# Patient Record
Sex: MALE | Race: WHITE
[De-identification: names, ages, dates, MRNs, and addresses within clinical notes are randomized per-mention and may not be internally consistent; named-entity substitution may affect disease eponyms.]

---

## 2018-05-21 ENCOUNTER — HOSPITAL ENCOUNTER (INPATIENT)
Dept: HOSPITAL 17 - NEPD | Age: 23
LOS: 4 days | Discharge: HOME | DRG: 505 | End: 2018-05-25
Payer: COMMERCIAL

## 2018-05-21 VITALS
RESPIRATION RATE: 19 BRPM | SYSTOLIC BLOOD PRESSURE: 141 MMHG | HEART RATE: 72 BPM | DIASTOLIC BLOOD PRESSURE: 83 MMHG | OXYGEN SATURATION: 100 % | TEMPERATURE: 97.7 F

## 2018-05-21 VITALS
HEART RATE: 97 BPM | RESPIRATION RATE: 15 BRPM | SYSTOLIC BLOOD PRESSURE: 118 MMHG | TEMPERATURE: 98.1 F | OXYGEN SATURATION: 97 % | DIASTOLIC BLOOD PRESSURE: 74 MMHG

## 2018-05-21 VITALS
HEART RATE: 75 BPM | SYSTOLIC BLOOD PRESSURE: 140 MMHG | OXYGEN SATURATION: 97 % | DIASTOLIC BLOOD PRESSURE: 65 MMHG | RESPIRATION RATE: 16 BRPM

## 2018-05-21 VITALS — WEIGHT: 277.56 LBS | HEIGHT: 71 IN | BODY MASS INDEX: 38.86 KG/M2

## 2018-05-21 VITALS — OXYGEN SATURATION: 100 %

## 2018-05-21 DIAGNOSIS — S93.324A: Primary | ICD-10-CM
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
## 2018-05-21 LAB
BASOPHILS # BLD AUTO: 0 TH/MM3 (ref 0–0.2)
BASOPHILS NFR BLD: 0.3 % (ref 0–2)
BUN SERPL-MCNC: 15 MG/DL (ref 7–18)
CALCIUM SERPL-MCNC: 8.6 MG/DL (ref 8.5–10.1)
CHLORIDE SERPL-SCNC: 105 MEQ/L (ref 98–107)
COLOR UR: YELLOW
CREAT SERPL-MCNC: 1.11 MG/DL (ref 0.6–1.3)
EOSINOPHIL # BLD: 0.3 TH/MM3 (ref 0–0.4)
EOSINOPHIL NFR BLD: 1.8 % (ref 0–4)
ERYTHROCYTE [DISTWIDTH] IN BLOOD BY AUTOMATED COUNT: 13.4 % (ref 11.6–17.2)
GFR SERPLBLD BASED ON 1.73 SQ M-ARVRAT: 82 ML/MIN (ref 89–?)
GLUCOSE SERPL-MCNC: 91 MG/DL (ref 74–106)
GLUCOSE UR STRIP-MCNC: (no result) MG/DL
HCO3 BLD-SCNC: 26.5 MEQ/L (ref 21–32)
HCT VFR BLD CALC: 42.6 % (ref 39–51)
HGB BLD-MCNC: 14.5 GM/DL (ref 13–17)
HGB UR QL STRIP: (no result)
HYALINE CASTS #/AREA URNS LPF: 2 /LPF
INR PPP: 1 RATIO
KETONES UR STRIP-MCNC: (no result) MG/DL
LYMPHOCYTES # BLD AUTO: 1.8 TH/MM3 (ref 1–4.8)
LYMPHOCYTES NFR BLD AUTO: 10.8 % (ref 9–44)
MCH RBC QN AUTO: 29.7 PG (ref 27–34)
MCHC RBC AUTO-ENTMCNC: 33.9 % (ref 32–36)
MCV RBC AUTO: 87.6 FL (ref 80–100)
MONOCYTE #: 0.9 TH/MM3 (ref 0–0.9)
MONOCYTES NFR BLD: 5.1 % (ref 0–8)
MUCOUS THREADS #/AREA URNS LPF: (no result) /LPF
NEUTROPHILS # BLD AUTO: 13.6 TH/MM3 (ref 1.8–7.7)
NEUTROPHILS NFR BLD AUTO: 82 % (ref 16–70)
NITRITE UR QL STRIP: (no result)
PLATELET # BLD: 309 TH/MM3 (ref 150–450)
PMV BLD AUTO: 7.2 FL (ref 7–11)
PROTHROMBIN TIME: 10.6 SEC (ref 9.8–11.6)
RBC # BLD AUTO: 4.87 MIL/MM3 (ref 4.5–5.9)
SODIUM SERPL-SCNC: 139 MEQ/L (ref 136–145)
SP GR UR STRIP: 1.01 (ref 1–1.03)
URINE LEUKOCYTE ESTERASE: (no result)
WBC # BLD AUTO: 16.6 TH/MM3 (ref 4–11)

## 2018-05-21 PROCEDURE — 96374 THER/PROPH/DIAG INJ IV PUSH: CPT

## 2018-05-21 PROCEDURE — 73564 X-RAY EXAM KNEE 4 OR MORE: CPT

## 2018-05-21 PROCEDURE — 28600 TREAT FOOT DISLOCATION: CPT

## 2018-05-21 PROCEDURE — 80048 BASIC METABOLIC PNL TOTAL CA: CPT

## 2018-05-21 PROCEDURE — 85025 COMPLETE CBC W/AUTO DIFF WBC: CPT

## 2018-05-21 PROCEDURE — 84100 ASSAY OF PHOSPHORUS: CPT

## 2018-05-21 PROCEDURE — 70450 CT HEAD/BRAIN W/O DYE: CPT

## 2018-05-21 PROCEDURE — 72125 CT NECK SPINE W/O DYE: CPT

## 2018-05-21 PROCEDURE — 94150 VITAL CAPACITY TEST: CPT

## 2018-05-21 PROCEDURE — C1713 ANCHOR/SCREW BN/BN,TIS/BN: HCPCS

## 2018-05-21 PROCEDURE — 99152 MOD SED SAME PHYS/QHP 5/>YRS: CPT

## 2018-05-21 PROCEDURE — 73620 X-RAY EXAM OF FOOT: CPT

## 2018-05-21 PROCEDURE — 76000 FLUOROSCOPY <1 HR PHYS/QHP: CPT

## 2018-05-21 PROCEDURE — 99153 MOD SED SAME PHYS/QHP EA: CPT

## 2018-05-21 PROCEDURE — 73610 X-RAY EXAM OF ANKLE: CPT

## 2018-05-21 PROCEDURE — 83735 ASSAY OF MAGNESIUM: CPT

## 2018-05-21 PROCEDURE — 85610 PROTHROMBIN TIME: CPT

## 2018-05-21 PROCEDURE — 81001 URINALYSIS AUTO W/SCOPE: CPT

## 2018-05-21 PROCEDURE — 36415 COLL VENOUS BLD VENIPUNCTURE: CPT

## 2018-05-21 PROCEDURE — 86901 BLOOD TYPING SEROLOGIC RH(D): CPT

## 2018-05-21 PROCEDURE — 74177 CT ABD & PELVIS W/CONTRAST: CPT

## 2018-05-21 PROCEDURE — 0SSKXZZ REPOSITION RIGHT TARSOMETATARSAL JOINT, EXTERNAL APPROACH: ICD-10-PCS | Performed by: PODIATRIST

## 2018-05-21 PROCEDURE — 86900 BLOOD TYPING SEROLOGIC ABO: CPT

## 2018-05-21 PROCEDURE — 71260 CT THORAX DX C+: CPT

## 2018-05-21 PROCEDURE — 73630 X-RAY EXAM OF FOOT: CPT

## 2018-05-21 PROCEDURE — 80053 COMPREHEN METABOLIC PANEL: CPT

## 2018-05-21 PROCEDURE — 86850 RBC ANTIBODY SCREEN: CPT

## 2018-05-21 RX ADMIN — Medication SCH ML: at 21:00
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
## 2018-05-21 NOTE — RADRPT
EXAM DATE/TIME:  05/21/2018 18:18 

 

[facility] COMPARISON:     

FOOT RIGHT COMPLETE ([id number]), May 21, 2018, 16:00.

 

                     

INDICATIONS :     

Post reduction right foot

                     

 

MEDICAL HISTORY :     

None.          

 

SURGICAL HISTORY :     

None.   

 

ENCOUNTER:     

Subsequent                                        

 

ACUITY:     

1 day      

 

PAIN SCORE:     

Non-responsive.

 

LOCATION:     

Right  Foot

 

FINDINGS:     

2 views of the right foot were performed with split material in place. There has been reduction of th
e previously seen Lisfranc fracture/dislocation.

 

CONCLUSION:     

Reduction of the previously seen the Lisfranc fracture/dislocation.

 

 

 

 Cuauhtemoc Vale Jr., MD on May 21, 2018 at 18:41           

Board Certified Radiologist.

 This report was verified electronically.
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
## 2018-05-22 VITALS
RESPIRATION RATE: 17 BRPM | DIASTOLIC BLOOD PRESSURE: 58 MMHG | HEART RATE: 65 BPM | OXYGEN SATURATION: 95 % | TEMPERATURE: 98.4 F | SYSTOLIC BLOOD PRESSURE: 124 MMHG

## 2018-05-22 VITALS
OXYGEN SATURATION: 98 % | DIASTOLIC BLOOD PRESSURE: 68 MMHG | TEMPERATURE: 97.7 F | RESPIRATION RATE: 18 BRPM | HEART RATE: 59 BPM | SYSTOLIC BLOOD PRESSURE: 119 MMHG

## 2018-05-22 VITALS
DIASTOLIC BLOOD PRESSURE: 63 MMHG | RESPIRATION RATE: 18 BRPM | TEMPERATURE: 97.9 F | OXYGEN SATURATION: 96 % | SYSTOLIC BLOOD PRESSURE: 115 MMHG | HEART RATE: 64 BPM

## 2018-05-22 VITALS
RESPIRATION RATE: 17 BRPM | OXYGEN SATURATION: 98 % | DIASTOLIC BLOOD PRESSURE: 57 MMHG | SYSTOLIC BLOOD PRESSURE: 110 MMHG | HEART RATE: 64 BPM | TEMPERATURE: 98.2 F

## 2018-05-22 VITALS
SYSTOLIC BLOOD PRESSURE: 123 MMHG | OXYGEN SATURATION: 97 % | TEMPERATURE: 98.6 F | HEART RATE: 56 BPM | RESPIRATION RATE: 17 BRPM | DIASTOLIC BLOOD PRESSURE: 70 MMHG

## 2018-05-22 PROCEDURE — 0SSK04Z REPOSITION RIGHT TARSOMETATARSAL JOINT WITH INTERNAL FIXATION DEVICE, OPEN APPROACH: ICD-10-PCS | Performed by: PODIATRIST

## 2018-05-22 RX ADMIN — MORPHINE SULFATE PRN MG: 2 INJECTION, SOLUTION INTRAMUSCULAR; INTRAVENOUS at 05:49

## 2018-05-22 RX ADMIN — Medication SCH ML: at 21:00

## 2018-05-22 RX ADMIN — Medication SCH ML: at 08:44

## 2018-05-22 NOTE — HHI.PR
Subjective


Remarks


Patient seen in preop. States pain is well controlled. Reports no change in 

sensation. States the only pain he left today was when he woke up.





Objective





Vital Signs








  Date Time  Temp Pulse Resp B/P (MAP) Pulse Ox O2 Delivery O2 Flow Rate FiO2


 


5/22/18 16:00 97.9 64 18 115/63 (80) 96   


 


5/22/18 12:00 97.7 59 18 119/68 (85) 98   


 


5/22/18 08:00 98.2 64 17 110/57 (74) 98   


 


5/22/18 04:00 98.6 56 17 123/70 (87) 97   


 


5/22/18 00:00 98.4 65 17 124/58 (80) 95   


 


5/21/18 20:40 98.1 97 15 118/74 (89) 97   


 


5/21/18 20:12        


 


5/21/18 20:00  75 16 140/65 (90) 97 Room Air  


 


5/21/18 18:10     100   


 


5/21/18 18:10     100 Nasal Cannula 4.00 


 


5/21/18 18:10     100  4.00 














I/O      


 


 5/21/18 5/21/18 5/21/18 5/22/18 5/22/18 5/22/18





 07:00 15:00 23:00 07:00 15:00 23:00


 


Intake Total   360 ml 50 ml  


 


Output Total    1 ml  


 


Balance   360 ml 49 ml  


 


      


 


Intake Oral   360 ml 50 ml  


 


Output Urine Total    1 ml  


 


# Voids    1  








Result Diagram:  


5/21/18 1615                                                                   

             5/21/18 1615





Imaging





Last Impressions








Head CT 5/21/18 1540 Signed





Impressions: 





 Service Date/Time:  Monday, May 21, 2018 17:34 - CONCLUSION:  Normal 





 examination.       Austyn Burnett MD 


 


Chest CT 5/21/18 1540 Signed





Impressions: 





 Service Date/Time:  Monday, May 21, 2018 17:39 - CONCLUSION:  Normal 





 examination.       Cuauhtemoc Vale Jr., MD 


 


Cervical Spine CT 5/21/18 1540 Signed





Impressions: 





 Service Date/Time:  Monday, May 21, 2018 17:34 - CONCLUSION:  Normal 





 examination.       Cuauhtemoc Vale Jr., MD 


 


Abdomen/Pelvis CT 5/21/18 1540 Signed





Impressions: 





 Service Date/Time:  Monday, May 21, 2018 17:39 - CONCLUSION: Normal 

examination. 





       Cuauhtemoc Vale Jr., MD 


 


Knee X-Ray 5/21/18 0000 Signed





Impressions: 





 Service Date/Time:  Monday, May 21, 2018 17:56 - CONCLUSION:  Unremarkable 





 examination of the right knee.       Cuauhtemoc Vale Jr., MD 


 


Foot X-Ray 5/21/18 0000 Signed





Impressions: 





 Service Date/Time:  Monday, May 21, 2018 18:18 - CONCLUSION:  Reduction of the 





 previously seen the Lisfranc fracture/dislocation.     Cuauhtemoc Vale Jr., MD 


 


Ankle X-Ray 5/21/18 0000 Signed





Impressions: 





 Service Date/Time:  Monday, May 21, 2018 15:58 - CONCLUSION:  No acute bony 





 injury in the right ankle.     Austyn Burnett MD 








Objective Remarks


Right LE in splint. Active/passive DF/PF of digits x5 to RLE. Soft tissue with 

skin lines present to forefoot.


Medications and IVs





Current Medications








 Medications


  (Trade)  Dose


 Ordered  Sig/Tania


 Route  Start Time


 Stop Time Status Last Admin


 


  (NS Flush)  2 ml  UNSCH  PRN


 IV FLUSH  5/21/18 19:15


     


 


 


  (NS Flush)  2 ml  BID


 IV FLUSH  5/21/18 21:00


    5/22/18 08:44


 


 


  (Tylenol)  650 mg  Q4H  PRN


 PO  5/21/18 19:15


     


 


 


  (Narcan Inj)  0.4 mg  UNSCH  PRN


 IV PUSH  5/21/18 19:15


     


 


 


  (Milk Of


 Magnesia Liq)  30 ml  Q12H  PRN


 PO  5/21/18 19:15


     


 


 


  (Senokot)  17.2 mg  Q12H  PRN


 PO  5/21/18 19:15


     


 


 


  (Dulcolax Supp)  10 mg  DAILY  PRN


 RECTAL  5/21/18 19:15


     


 


 


  (Lactulose Liq)  30 ml  DAILY  PRN


 PO  5/21/18 19:15


     


 


 


  (Norco  7.5-325


 Mg)  1 tab  Q6H  PRN


 PO  5/21/18 19:15


     


 


 


  (Morphine Inj)  4 mg  Q3H  PRN


 IV PUSH  5/21/18 19:15


    5/22/18 05:49


 


 


 Lactated Ringer's  1,000 ml @ 


 30 mls/hr  Q24H PRN


 IV  5/21/18 22:30


 5/24/18 22:29   


 


 


 Sodium Chloride  500 ml @ 


 30 mls/hr  [id number] PRN


 IV  5/21/18 22:30


 5/24/18 22:29   


 


 


  (Lopressor)  25 mg  ON CALL  PRN


 PO  5/21/18 22:30


 5/24/18 22:29   


 


 


  (Betadine 5%


 Antisepsis Kit)  1 applic  ON CALL  PRN


 EACH NARE  5/21/18 22:30


 5/24/18 22:29   


 


 


  (Chlorhexidine


 2% Cloth)  3 pack  ON CALL  PRN


 TOPICAL  5/21/18 22:30


 5/24/18 22:29   


 











Assessment and Plan


Assessment and Plan


23-year-old male with right medial Lisfranc dislocation involving all 

metatarsals





Patient examined and evaluated with all questions answered


To OR today for open reduction internal fixation if soft tissue allows, 

external fixation if soft tissue swelling/fracture blisters present


Consent to read open reduction internal fixation and/or external fixation of 

right foot Lisfranc dislocation


Consent signed 


Patient has been N.p.o. after midnight


RLE marked











Shannan Parrish DPM May 22, 2018 17:55
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
## 2018-05-22 NOTE — HHI.PR
__________________________________________________





Immediate Post Op Note


Procedure Date:


May 22, 2018


Pre Op Diagnosis:  


Right foot Lisfranc dislocation


Post Op Diagnosis:  


Right foot Lisfranc dislocation


Surgeon:


Shannan Parrish


Assistant(s):


None


Procedure:


Right Lisfranc's ORIF


Findings:


None


Complications:


None


Specimen(s) removed:


None


Estimated blood loss:


None


Anesthesia:  General


Drains:  None


IVF (Neurovascular status intact to right foot)


Patient to:  PACU


Patient Condition:  Shannan Lo DPM May 22, 2018 21:49

## 2018-05-23 VITALS
DIASTOLIC BLOOD PRESSURE: 78 MMHG | RESPIRATION RATE: 18 BRPM | SYSTOLIC BLOOD PRESSURE: 132 MMHG | HEART RATE: 78 BPM | TEMPERATURE: 98.4 F | OXYGEN SATURATION: 95 %

## 2018-05-23 VITALS
OXYGEN SATURATION: 95 % | SYSTOLIC BLOOD PRESSURE: 130 MMHG | HEART RATE: 92 BPM | DIASTOLIC BLOOD PRESSURE: 69 MMHG | RESPIRATION RATE: 18 BRPM | TEMPERATURE: 98.9 F

## 2018-05-23 VITALS
RESPIRATION RATE: 18 BRPM | DIASTOLIC BLOOD PRESSURE: 67 MMHG | HEART RATE: 71 BPM | TEMPERATURE: 97.9 F | SYSTOLIC BLOOD PRESSURE: 123 MMHG | OXYGEN SATURATION: 97 %

## 2018-05-23 VITALS
SYSTOLIC BLOOD PRESSURE: 148 MMHG | HEART RATE: 75 BPM | OXYGEN SATURATION: 93 % | TEMPERATURE: 98 F | DIASTOLIC BLOOD PRESSURE: 96 MMHG | RESPIRATION RATE: 18 BRPM

## 2018-05-23 VITALS
TEMPERATURE: 98.2 F | SYSTOLIC BLOOD PRESSURE: 149 MMHG | OXYGEN SATURATION: 97 % | HEART RATE: 97 BPM | RESPIRATION RATE: 18 BRPM | DIASTOLIC BLOOD PRESSURE: 70 MMHG

## 2018-05-23 VITALS
HEART RATE: 92 BPM | RESPIRATION RATE: 18 BRPM | DIASTOLIC BLOOD PRESSURE: 86 MMHG | TEMPERATURE: 98.6 F | OXYGEN SATURATION: 95 % | SYSTOLIC BLOOD PRESSURE: 132 MMHG

## 2018-05-23 RX ADMIN — MORPHINE SULFATE PRN MG: 2 INJECTION, SOLUTION INTRAMUSCULAR; INTRAVENOUS at 20:02

## 2018-05-23 RX ADMIN — Medication SCH ML: at 20:05

## 2018-05-23 RX ADMIN — Medication SCH ML: at 10:17

## 2018-05-23 RX ADMIN — OXYCODONE HYDROCHLORIDE AND ACETAMINOPHEN PRN TAB: 5; 325 TABLET ORAL at 04:20

## 2018-05-23 RX ADMIN — ENOXAPARIN SODIUM SCH MG: 40 INJECTION SUBCUTANEOUS at 10:17

## 2018-05-23 RX ADMIN — OXYCODONE HYDROCHLORIDE AND ACETAMINOPHEN PRN TAB: 5; 325 TABLET ORAL at 10:16

## 2018-05-23 RX ADMIN — OXYCODONE HYDROCHLORIDE AND ACETAMINOPHEN PRN TAB: 5; 325 TABLET ORAL at 16:10

## 2018-05-23 NOTE — RADRPT
EXAM DATE:  5/22/2018 11:49 PM EDT

AGE/SEX:        23 years / Male



INDICATIONS:  ORIF of the right foot.



CLINICAL DATA:  This is the patient's initial encounter. Patient reports that signs and symptoms have
 been present for 1 day and indicates a pain score of Nonresponsive. 

                                                                          

MEDICAL/SURGICAL HISTORY:       None. None.



COMPARISON:      No prior [place] exams available for comparison.



FINDINGS:  

Status post internal fixation involving the foot. There appears to be good position and alignment of 
the bony structures as well as the hardware.



CONCLUSION: 

Good position and alignment on this postoperative study.









Electronically signed by: Joseph Ambrose MD  5/23/2018 5:34 PM EDT

## 2018-05-23 NOTE — HHI.PR
Subjective


Remarks


Patient seen bedside with grandmother and mother present.  Patient is concerned 

that his right heel is very painful, he believes it is from the posterior 

splint.  Patient states he has been icing and elevating.  Denies any nausea 

vomiting fevers or chills.  States pain is well controlled.  Reports normal 

sensation with some numbness and tingling.





Objective





Vital Signs








  Date Time  Temp Pulse Resp B/P (MAP) Pulse Ox O2 Delivery O2 Flow Rate FiO2


 


5/23/18 16:32 98.2 97 18 149/70 (96) 97   


 


5/23/18 11:56 98.9 92 18 130/69 (89) 95   


 


5/23/18 08:00 98.6 92 18 132/86 (101) 95   


 


5/23/18 04:00 98.4 75 17 122/68 (86) 96   


 


5/23/18 00:01 98.4 78 18 132/78 (96) 95   


 


5/22/18 22:00  79 11 128/75 (92) 96 Nasal Cannula 4 


 


5/22/18 21:45  95 12 134/73 (93) 95 Room Air  


 


5/22/18 21:40 97.6 95 12 130/58 (82) 95 Room Air  














I/O      


 


 5/22/18 5/22/18 5/22/18 5/23/18 5/23/18 5/23/18





 07:00 15:00 23:00 07:00 15:00 23:00


 


Intake Total 50 ml  1300 ml 360 ml  1100 ml


 


Output Total 1 ml  470 ml 1000 ml  


 


Balance 49 ml  830 ml -640 ml  1100 ml


 


      


 


Intake Oral 50 ml   360 ml  1100 ml


 


IV Total   1300 ml   


 


Output Urine Total 1 ml  450 ml 1000 ml  


 


Estimated Blood Loss   20 ml   


 


# Voids 1  2   4


 


# Bowel Movements    0  








Result Diagram:  


5/21/18 1615                                                                   

             5/21/18 1615





Imaging





Last Impressions








Foot X-Ray 5/22/18 0000 Signed





Impressions: 





 CONCLUSION: 





 Good position and alignment on this postoperative study.





  





 





  





 





  





 





  





 


 


Head CT 5/21/18 1540 Signed





Impressions: 





 Service Date/Time:  Monday, May 21, 2018 17:34 - CONCLUSION:  Normal 





 examination.       Austyn Burnett MD 


 


Chest CT 5/21/18 1540 Signed





Impressions: 





 Service Date/Time:  Monday, May 21, 2018 17:39 - CONCLUSION:  Normal 





 examination.       Cuauhtemoc Vale Jr., MD 


 


Cervical Spine CT 5/21/18 1540 Signed





Impressions: 





 Service Date/Time:  Monday, May 21, 2018 17:34 - CONCLUSION:  Normal 





 examination.       Cuauhtemoc Vale Jr., MD 


 


Abdomen/Pelvis CT 5/21/18 1540 Signed





Impressions: 





 Service Date/Time:  Monday, May 21, 2018 17:39 - CONCLUSION: Normal 

examination. 





       Cuauhtemoc Vale Jr., MD 


 


Knee X-Ray 5/21/18 0000 Signed





Impressions: 





 Service Date/Time:  Monday, May 21, 2018 17:56 - CONCLUSION:  Unremarkable 





 examination of the right knee.       Cuauhtemoc Vale Jr., MD 


 


Ankle X-Ray 5/21/18 0000 Signed





Impressions: 





 Service Date/Time:  Monday, May 21, 2018 15:58 - CONCLUSION:  No acute bony 





 injury in the right ankle.     Austyn Burnett MD 








Procedures


One day status post right Lisfranc dislocation ORIF with internal and external 

fixation


Objective Remarks


Right LE in splint. Active/passive DF/PF of digits x5 to RLE. No strikethrough 

noted. CRT under 3 secs to digits x5 right foot.


Medications and IVs





Current Medications








 Medications


  (Trade)  Dose


 Ordered  Sig/Tania


 Route  Start Time


 Stop Time Status Last Admin


 


  (NS Flush)  2 ml  UNSCH  PRN


 IV FLUSH  5/21/18 19:15


     


 


 


  (NS Flush)  2 ml  BID


 IV FLUSH  5/21/18 21:00


    5/23/18 10:17


 


 


  (Tylenol)  650 mg  Q4H  PRN


 PO  5/21/18 19:15


     


 


 


  (Narcan Inj)  0.4 mg  UNSCH  PRN


 IV PUSH  5/21/18 19:15


     


 


 


  (Milk Of


 Magnesia Liq)  30 ml  Q12H  PRN


 PO  5/21/18 19:15


     


 


 


  (Senokot)  17.2 mg  Q12H  PRN


 PO  5/21/18 19:15


     


 


 


  (Dulcolax Supp)  10 mg  DAILY  PRN


 RECTAL  5/21/18 19:15


     


 


 


  (Lactulose Liq)  30 ml  DAILY  PRN


 PO  5/21/18 19:15


     


 


 


  (Norco  7.5-325


 Mg)  1 tab  Q6H  PRN


 PO  5/21/18 19:15


     


 


 


  (Morphine Inj)  4 mg  Q3H  PRN


 IV PUSH  5/21/18 19:15


    5/22/18 05:49


 


 


 Lactated Ringer's  1,000 ml @ 


 30 mls/hr  Q24H PRN


 IV  5/21/18 22:30


 5/24/18 22:29   


 


 


 Sodium Chloride  500 ml @ 


 30 mls/hr  [id number] PRN


 IV  5/21/18 22:30


 5/24/18 22:29   


 


 


  (Lopressor)  25 mg  ON CALL  PRN


 PO  5/21/18 22:30


 5/24/18 22:29   


 


 


  (Betadine 5%


 Antisepsis Kit)  1 applic  ON CALL  PRN


 EACH NARE  5/21/18 22:30


 5/24/18 22:29   


 


 


  (Chlorhexidine


 2% Cloth)  3 pack  ON CALL  PRN


 TOPICAL  5/21/18 22:30


 5/24/18 22:29   


 


 


  (Lovenox Inj)  40 mg  Q24H


 SQ  5/23/18 10:00


    5/23/18 10:17


 


 


  (Percocet  5-325


 Mg)  2 tab  Q6H  PRN


 PO  5/22/18 22:00


    5/23/18 16:10


 


 


  (Misc Nursing


 Information)  ALL


 NURSING


 DEPARTME...  UNSCH  PRN


 .XX  5/22/18 22:15


 5/23/18 22:14   


 


 


  (Benadryl)  25 mg  Q6H  PRN


 PO  5/23/18 19:00


     


 











Assessment and Plan


Assessment and Plan


23-year-old male with right medial Lisfranc dislocation involving all 

metatarsals





Patient examined and evaluated with all questions answered


Padding placed to posterior splint heel


We will change bandages tomorrow


Anticipate DC Friday


Patient will need to be DC'd on appropriate pain medications


Patient will need to be DC'd on Lovenox 40 mg subcu daily 3 weeks











Shannan Parrish DPM May 23, 2018 19:31
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
## 2018-05-24 VITALS
RESPIRATION RATE: 17 BRPM | OXYGEN SATURATION: 94 % | HEART RATE: 70 BPM | SYSTOLIC BLOOD PRESSURE: 110 MMHG | DIASTOLIC BLOOD PRESSURE: 58 MMHG | TEMPERATURE: 98 F

## 2018-05-24 VITALS
SYSTOLIC BLOOD PRESSURE: 112 MMHG | TEMPERATURE: 98.6 F | OXYGEN SATURATION: 96 % | RESPIRATION RATE: 18 BRPM | HEART RATE: 67 BPM | DIASTOLIC BLOOD PRESSURE: 57 MMHG

## 2018-05-24 VITALS
DIASTOLIC BLOOD PRESSURE: 56 MMHG | OXYGEN SATURATION: 96 % | TEMPERATURE: 98.1 F | HEART RATE: 72 BPM | RESPIRATION RATE: 18 BRPM | SYSTOLIC BLOOD PRESSURE: 120 MMHG

## 2018-05-24 VITALS
TEMPERATURE: 97.9 F | RESPIRATION RATE: 18 BRPM | HEART RATE: 68 BPM | DIASTOLIC BLOOD PRESSURE: 69 MMHG | OXYGEN SATURATION: 97 % | SYSTOLIC BLOOD PRESSURE: 123 MMHG

## 2018-05-24 VITALS
DIASTOLIC BLOOD PRESSURE: 71 MMHG | SYSTOLIC BLOOD PRESSURE: 137 MMHG | HEART RATE: 87 BPM | OXYGEN SATURATION: 96 % | RESPIRATION RATE: 20 BRPM | TEMPERATURE: 98.5 F

## 2018-05-24 VITALS
SYSTOLIC BLOOD PRESSURE: 128 MMHG | HEART RATE: 80 BPM | RESPIRATION RATE: 18 BRPM | OXYGEN SATURATION: 95 % | TEMPERATURE: 98.1 F | DIASTOLIC BLOOD PRESSURE: 78 MMHG

## 2018-05-24 LAB
BASOPHILS # BLD AUTO: 0 TH/MM3 (ref 0–0.2)
BASOPHILS NFR BLD: 0.5 % (ref 0–2)
BUN SERPL-MCNC: 12 MG/DL (ref 7–18)
CALCIUM SERPL-MCNC: 8.5 MG/DL (ref 8.5–10.1)
CHLORIDE SERPL-SCNC: 105 MEQ/L (ref 98–107)
CREAT SERPL-MCNC: 1.04 MG/DL (ref 0.6–1.3)
EOSINOPHIL # BLD: 0.3 TH/MM3 (ref 0–0.4)
EOSINOPHIL NFR BLD: 2.8 % (ref 0–4)
ERYTHROCYTE [DISTWIDTH] IN BLOOD BY AUTOMATED COUNT: 13.5 % (ref 11.6–17.2)
GFR SERPLBLD BASED ON 1.73 SQ M-ARVRAT: 89 ML/MIN (ref 89–?)
GLUCOSE SERPL-MCNC: 94 MG/DL (ref 74–106)
HCO3 BLD-SCNC: 28.5 MEQ/L (ref 21–32)
HCT VFR BLD CALC: 42.9 % (ref 39–51)
HGB BLD-MCNC: 14.5 GM/DL (ref 13–17)
LYMPHOCYTES # BLD AUTO: 3.1 TH/MM3 (ref 1–4.8)
LYMPHOCYTES NFR BLD AUTO: 33.4 % (ref 9–44)
MCH RBC QN AUTO: 29.7 PG (ref 27–34)
MCHC RBC AUTO-ENTMCNC: 33.8 % (ref 32–36)
MCV RBC AUTO: 87.9 FL (ref 80–100)
MONOCYTE #: 0.9 TH/MM3 (ref 0–0.9)
MONOCYTES NFR BLD: 9.7 % (ref 0–8)
NEUTROPHILS # BLD AUTO: 4.9 TH/MM3 (ref 1.8–7.7)
NEUTROPHILS NFR BLD AUTO: 53.6 % (ref 16–70)
PLATELET # BLD: 294 TH/MM3 (ref 150–450)
PMV BLD AUTO: 7.1 FL (ref 7–11)
RBC # BLD AUTO: 4.88 MIL/MM3 (ref 4.5–5.9)
SODIUM SERPL-SCNC: 141 MEQ/L (ref 136–145)
WBC # BLD AUTO: 9.2 TH/MM3 (ref 4–11)

## 2018-05-24 RX ADMIN — OXYCODONE HYDROCHLORIDE AND ACETAMINOPHEN PRN TAB: 5; 325 TABLET ORAL at 23:28

## 2018-05-24 RX ADMIN — OXYCODONE HYDROCHLORIDE AND ACETAMINOPHEN PRN TAB: 5; 325 TABLET ORAL at 06:13

## 2018-05-24 RX ADMIN — Medication SCH ML: at 09:18

## 2018-05-24 RX ADMIN — Medication SCH ML: at 20:21

## 2018-05-24 RX ADMIN — OXYCODONE HYDROCHLORIDE AND ACETAMINOPHEN PRN TAB: 5; 325 TABLET ORAL at 17:31

## 2018-05-24 RX ADMIN — ENOXAPARIN SODIUM SCH MG: 40 INJECTION SUBCUTANEOUS at 09:18
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
## 2018-05-24 NOTE — HHI.PR
Subjective


Remarks


Patient seen bedside with grandmother present.  Patient's heel pain has not 

resolved and he is having no issues with heel pain to right foot.  Patient 

states he has been icing and elevating.  Denies any nausea vomiting fevers or 

chills.  States pain is well controlled.  Reports normal sensation.





Objective





Vital Signs








  Date Time  Temp Pulse Resp B/P (MAP) Pulse Ox O2 Delivery O2 Flow Rate FiO2


 


5/24/18 16:00 98.1 80 18 128/78 (95) 95   


 


5/24/18 12:00 98.1 72 18 120/56 (77) 96   


 


5/24/18 08:00 98.6 67 18 112/57 (75) 96   


 


5/24/18 04:00 97.9 68 18 123/69 (87) 97   


 


5/24/18 00:01 98.0 70 17 110/58 (75) 94   


 


5/23/18 20:00 97.9 71 18 123/67 (85) 97   














I/O      


 


 5/23/18 5/23/18 5/23/18 5/24/18 5/24/18 5/24/18





 06:59 14:59 22:59 06:59 14:59 22:59


 


Intake Total 360 ml  1100 ml 420 ml 600 ml 


 


Output Total 1000 ml   700 ml  


 


Balance -640 ml  1100 ml -280 ml 600 ml 


 


      


 


Intake Oral 360 ml  1100 ml 420 ml 600 ml 


 


Output Urine Total 1000 ml   700 ml  


 


# Voids   4  3 


 


# Bowel Movements 0   0  








Result Diagram:  


5/24/18 0546                                                                   

             5/24/18 0546





Imaging





Last Impressions








Foot X-Ray 5/22/18 0000 Signed





Impressions: 





 CONCLUSION: 





 Good position and alignment on this postoperative study.





  





 





  





 





  





 





  





 


 


Head CT 5/21/18 1540 Signed





Impressions: 





 Service Date/Time:  Monday, May 21, 2018 17:34 - CONCLUSION:  Normal 





 examination.       Austyn Burnett MD 


 


Chest CT 5/21/18 1540 Signed





Impressions: 





 Service Date/Time:  Monday, May 21, 2018 17:39 - CONCLUSION:  Normal 





 examination.       Cuauhtemoc Vale Jr., MD 


 


Cervical Spine CT 5/21/18 1540 Signed





Impressions: 





 Service Date/Time:  Monday, May 21, 2018 17:34 - CONCLUSION:  Normal 





 examination.       Cuauhtemoc Vale Jr., MD 


 


Abdomen/Pelvis CT 5/21/18 1540 Signed





Impressions: 





 Service Date/Time:  Monday, May 21, 2018 17:39 - CONCLUSION: Normal 

examination. 





       Cuauhtemoc Vale Jr., MD 


 


Knee X-Ray 5/21/18 0000 Signed





Impressions: 





 Service Date/Time:  Monday, May 21, 2018 17:56 - CONCLUSION:  Unremarkable 





 examination of the right knee.       Cuauhtemoc Vale Jr., MD 


 


Ankle X-Ray 5/21/18 0000 Signed





Impressions: 





 Service Date/Time:  Monday, May 21, 2018 15:58 - CONCLUSION:  No acute bony 





 injury in the right ankle.     Austyn Burnett MD 








Procedures


2 days status post right Lisfranc dislocation ORIF with internal and external 

fixation


Objective Remarks


Right LE in splint. Active/passive DF/PF of digits x5 to RLE. No strikethrough 

noted. CRT under 3 secs to digits x5 right foot.  Upon takedown of right lower 

extremity posterior splint incisions to right dorsal foot with skin well 

coapted and sutures intact.  No clinical signs of infection.  No surrounding 

erythema and decreased edema noted.  K wires 3 noted with no drainage or 

surrounding erythema.


Medications and IVs





Current Medications








 Medications


  (Trade)  Dose


 Ordered  Sig/Tania


 Route  Start Time


 Stop Time Status Last Admin


 


  (NS Flush)  2 ml  UNSCH  PRN


 IV FLUSH  5/21/18 19:15


     


 


 


  (NS Flush)  2 ml  BID


 IV FLUSH  5/21/18 21:00


    5/24/18 09:18


 


 


  (Tylenol)  650 mg  Q4H  PRN


 PO  5/21/18 19:15


     


 


 


  (Narcan Inj)  0.4 mg  UNSCH  PRN


 IV PUSH  5/21/18 19:15


     


 


 


  (Milk Of


 Magnesia Liq)  30 ml  Q12H  PRN


 PO  5/21/18 19:15


    5/24/18 09:22


 


 


  (Senokot)  17.2 mg  Q12H  PRN


 PO  5/21/18 19:15


     


 


 


  (Dulcolax Supp)  10 mg  DAILY  PRN


 RECTAL  5/21/18 19:15


     


 


 


  (Lactulose Liq)  30 ml  DAILY  PRN


 PO  5/21/18 19:15


     


 


 


  (Norco  7.5-325


 Mg)  1 tab  Q6H  PRN


 PO  5/21/18 19:15


     


 


 


  (Morphine Inj)  4 mg  Q3H  PRN


 IV PUSH  5/21/18 19:15


    5/23/18 20:02


 


 


 Lactated Ringer's  1,000 ml @ 


 30 mls/hr  Q24H PRN


 IV  5/21/18 22:30


 5/24/18 22:29   


 


 


 Sodium Chloride  500 ml @ 


 30 mls/hr  [id number] PRN


 IV  5/21/18 22:30


 5/24/18 22:29   


 


 


  (Lopressor)  25 mg  ON CALL  PRN


 PO  5/21/18 22:30


 5/24/18 22:29   


 


 


  (Betadine 5%


 Antisepsis Kit)  1 applic  ON CALL  PRN


 EACH NARE  5/21/18 22:30


 5/24/18 22:29   


 


 


  (Chlorhexidine


 2% Cloth)  3 pack  ON CALL  PRN


 TOPICAL  5/21/18 22:30


 5/24/18 22:29   


 


 


  (Percocet  5-325


 Mg)  2 tab  Q6H  PRN


 PO  5/22/18 22:00


    5/24/18 17:31


 


 


  (Benadryl)  25 mg  Q6H  PRN


 PO  5/23/18 19:00


    5/24/18 17:30


 


 


  (Xarelto)  10 mg  DAILY


 PO  5/25/18 09:00


     


 











Assessment and Plan


Assessment and Plan


23-year-old male with right medial Lisfranc dislocation involving all 

metatarsals





Patient examined and evaluated with all questions answered


Bandages changed to right lower extremity consisting of Xeroform 4 x 4's cast 

padding Jerald Asher compression dressing applied


Patient okay to be DC'd by podiatry tomorrow if appropriate accommodations can 

be made for patient's Lovenox


Patient will need to be DC'd on appropriate pain medications


Patient will need to be DC'd on Lovenox 40 mg subcu daily 3 weeks


Discussed discharge with hospitalist


Placed orders for Ortho tech to change posterior splint as physical therapy 

felt it was too long for range of motion to knee


Patient will follow up with me within 1 week











Shannan Parrish DPM May 24, 2018 18:59

## 2018-05-25 VITALS
TEMPERATURE: 98.4 F | DIASTOLIC BLOOD PRESSURE: 60 MMHG | RESPIRATION RATE: 20 BRPM | OXYGEN SATURATION: 96 % | HEART RATE: 68 BPM | SYSTOLIC BLOOD PRESSURE: 114 MMHG

## 2018-05-25 VITALS
DIASTOLIC BLOOD PRESSURE: 71 MMHG | HEART RATE: 61 BPM | OXYGEN SATURATION: 96 % | TEMPERATURE: 97.6 F | RESPIRATION RATE: 16 BRPM | SYSTOLIC BLOOD PRESSURE: 119 MMHG

## 2018-05-25 LAB
ALBUMIN SERPL-MCNC: 3.1 GM/DL (ref 3.4–5)
ALP SERPL-CCNC: 36 U/L (ref 45–117)
ALT SERPL-CCNC: 15 U/L (ref 12–78)
AST SERPL-CCNC: 6 U/L (ref 15–37)
BASOPHILS # BLD AUTO: 0.1 TH/MM3 (ref 0–0.2)
BASOPHILS NFR BLD: 0.7 % (ref 0–2)
BILIRUB SERPL-MCNC: 0.4 MG/DL (ref 0.2–1)
BUN SERPL-MCNC: 15 MG/DL (ref 7–18)
CALCIUM SERPL-MCNC: 8.7 MG/DL (ref 8.5–10.1)
CHLORIDE SERPL-SCNC: 104 MEQ/L (ref 98–107)
CREAT SERPL-MCNC: 1.1 MG/DL (ref 0.6–1.3)
EOSINOPHIL # BLD: 0.5 TH/MM3 (ref 0–0.4)
EOSINOPHIL NFR BLD: 5.9 % (ref 0–4)
ERYTHROCYTE [DISTWIDTH] IN BLOOD BY AUTOMATED COUNT: 13.4 % (ref 11.6–17.2)
GFR SERPLBLD BASED ON 1.73 SQ M-ARVRAT: 83 ML/MIN (ref 89–?)
GLUCOSE SERPL-MCNC: 91 MG/DL (ref 74–106)
HCO3 BLD-SCNC: 27.1 MEQ/L (ref 21–32)
HCT VFR BLD CALC: 42.1 % (ref 39–51)
HGB BLD-MCNC: 14 GM/DL (ref 13–17)
LYMPHOCYTES # BLD AUTO: 3.1 TH/MM3 (ref 1–4.8)
LYMPHOCYTES NFR BLD AUTO: 34.8 % (ref 9–44)
MAGNESIUM SERPL-MCNC: 2.2 MG/DL (ref 1.5–2.5)
MCH RBC QN AUTO: 29.5 PG (ref 27–34)
MCHC RBC AUTO-ENTMCNC: 33.3 % (ref 32–36)
MCV RBC AUTO: 88.6 FL (ref 80–100)
MONOCYTE #: 0.7 TH/MM3 (ref 0–0.9)
MONOCYTES NFR BLD: 8 % (ref 0–8)
NEUTROPHILS # BLD AUTO: 4.5 TH/MM3 (ref 1.8–7.7)
NEUTROPHILS NFR BLD AUTO: 50.6 % (ref 16–70)
PHOSPHATE SERPL-MCNC: 4.9 MG/DL (ref 2.5–4.9)
PLATELET # BLD: 311 TH/MM3 (ref 150–450)
PMV BLD AUTO: 7.2 FL (ref 7–11)
PROT SERPL-MCNC: 7 GM/DL (ref 6.4–8.2)
RBC # BLD AUTO: 4.75 MIL/MM3 (ref 4.5–5.9)
SODIUM SERPL-SCNC: 140 MEQ/L (ref 136–145)
WBC # BLD AUTO: 9 TH/MM3 (ref 4–11)

## 2018-05-25 RX ADMIN — Medication SCH ML: at 09:11

## 2018-05-25 RX ADMIN — OXYCODONE HYDROCHLORIDE AND ACETAMINOPHEN PRN TAB: 5; 325 TABLET ORAL at 06:22

## 2018-05-25 RX ADMIN — OXYCODONE HYDROCHLORIDE AND ACETAMINOPHEN PRN TAB: 5; 325 TABLET ORAL at 12:19
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
